# Patient Record
(demographics unavailable — no encounter records)

---

## 2024-11-11 NOTE — PHYSICAL EXAM
[Normal Conjunctiva] : normal conjunctiva [Normal Venous Pressure] : normal venous pressure [Normal S1, S2] : normal S1, S2 [Clear Lung Fields] : clear lung fields [Soft] : abdomen soft [Non Tender] : non-tender [No Edema] : no edema [de-identified] : 1/6 HUANG

## 2024-11-11 NOTE — HISTORY OF PRESENT ILLNESS
[FreeTextEntry1] : Patient here for f/u. Hx of atypical chest pain, palpitations, hypertension. No recent chest pain. SOB, palpitation, dizziness. Meds the same.

## 2024-12-16 NOTE — HISTORY OF PRESENT ILLNESS
[FreeTextEntry1] : Patient had elevated BP at last visit. New meds prescribed but patient did not fill. He recalled dietary indiscretions with alcohol intake that he felt affected his pressure. No current complaint except for usual back discomfort. Labs reviewed with the patient and are satisfactory.

## 2025-03-24 NOTE — PHYSICAL EXAM
[Normal Conjunctiva] : normal conjunctiva [Normal Venous Pressure] : normal venous pressure [Normal S1, S2] : normal S1, S2 [Clear Lung Fields] : clear lung fields [Soft] : abdomen soft [Non Tender] : non-tender [No Edema] : no edema [de-identified] : 1/6 HUANG

## 2025-03-24 NOTE — HISTORY OF PRESENT ILLNESS
[FreeTextEntry1] : Patient feeling well except for usual back pain. Has been walking for exercise n morning several days per week. No palpitations.

## 2025-06-23 NOTE — ASSESSMENT
[FreeTextEntry1] : ------------------------------------------- Same meds Routine GI endoscopy RTO 3 mos

## 2025-06-23 NOTE — PHYSICAL EXAM
[Normal Conjunctiva] : normal conjunctiva [Normal Venous Pressure] : normal venous pressure [Normal S1, S2] : normal S1, S2 [Clear Lung Fields] : clear lung fields [Soft] : abdomen soft [Non Tender] : non-tender [No Edema] : no edema [de-identified] : 1/6 HUANG